# Patient Record
(demographics unavailable — no encounter records)

---

## 2025-04-28 NOTE — REVIEW OF SYSTEMS
[Negative] : Heme/Lymph [FreeTextEntry5] : Hypertension [FreeTextEntry6] : Sleep apnea [FreeTextEntry7] : Loaded pain [de-identified] : Arthritis [de-identified] : Melanoma [de-identified] : Sciatica [de-identified] : Endometriosis

## 2025-04-28 NOTE — ASSESSMENT
[FreeTextEntry1] : 68-year-old lady.  Annual melanoma follow-up.  Clinically doing well.  5/11/2024: Chest x-ray at [facility] was normal. Prescription entered and copy provided to have the study repeated presently.  May 2024 bone density at [facility] was normal.  04/11/25: Bilateral mammogram and breast ultrasounds at [facility]: BI-RADS 2. Prescription provided for April 2026.   If asymptomatic, with normal imaging, we should see her in another year, sooner if needed.  In detail, all questions answered.

## 2025-04-28 NOTE — REVIEW OF SYSTEMS
[Negative] : Heme/Lymph [FreeTextEntry5] : Hypertension [FreeTextEntry6] : Sleep apnea [FreeTextEntry7] : Loaded pain [de-identified] : Arthritis [de-identified] : Melanoma [de-identified] : Sciatica [de-identified] : Endometriosis

## 2025-04-28 NOTE — HISTORY OF PRESENT ILLNESS
[de-identified] : 87-year-old lady.  Annual melanoma follow-up.   CC: Today she is asymptomatic.   2019:  Wide excision of an atypical dysplastic nevus on the posterior RIGHT THIGH, with negative margins, reconstruction by Dr. RAIZA VILLAFUERTE.  This was an asymptomatic pigmented lesion discovered during routine dermatologic surveillance (below).   2009: wide excision of a 1.4 mm melanoma of the RIGHT POPLITEAL fossa, with negative margins and benign right inguinal sentinel lymph node biopsy. Reconstruction was by Dr. Solo BAIRD.   No other personal history of malignancy.    No relatives with melanoma.  Family history of malignancy: A sister had stomach cancer.   Her dermatologist is Dr. Benigno TATUM. Annual visit for 2025 is already scheduled.   PCP: Dr. Robin SOTELO. She used to see Dr. Santosh Borrego, then Dr. John Reyes.  + Allergic: Amlodipine.  She does not have a pacemaker or defibrillator. No anticoagulants.  Spring 2024 she was diagnosed with sleep apnea. Pulmonary: Dr. Blake LEIWS. Due to severe claustrophobia, she is unable to tolerate any of the devices. Her sleep apnea is considered too severe for an oral appliance.  Her internist (above) has started WEGOVY in 2025 in hopes that weight loss will help with the DULCE MARIA.  Blood pressure controlled with Bystolic.  Hyperlipidemia is treated with Repatha.  Cardiology: Dr. Baudilio DESOUZA. She used to see Dr. Neri Sanchez.   2025: Her dentist noticed a lesion on her tongue. Oral surgery consultation recommended. She was only able to secure an appointment in 2025. She asked for suggestions, I offered the name of Dr. Jaime Guillen, and provided her with his contact information.  In the beginning of , she developed bilateral numbness and tingling in the feet, sometimes making it difficult for her to walk. Testing revealed + LATRICE. She has osteoarthritis. Family history: Mother: Lupus and ITP.  Rheumatology evaluation: Dr. Ira CRUZ. Orthopedics: Dr. Alban AUGUSTE. Neurology: Dr. Modesta RAO. No one was able to provide diagnosis, or symptom relief.  She has had epidural injections by pain management (Dr. Jaime STEWART) with no relief. She has bilateral lower extremity sciatica. She would likely follow-up with her neurosurgeon, Dr. Will.  2023: Lumbar microdiscectomy.  2022. Anterior cervical fusion. 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   EYES: She has regular examinations with Dr. Paolo JIN in [place]. She is scheduled for OS cataract surgery in 2025 with Dr. Benitez, from [facility].   GYN:  2013 s/p hysterectomy LSO for endometriosis in  (Dr. Ross MORENO) [place].   She last saw him to . DECLINES follow-up.  Menarche:  12 Menopause:   Age at first pregnancy:  26 Breast fed:  both children 4 - 6 months each No HRT  BRCA: Never been tested.  2024: Bilateral mammogram and sonogram at [facility]: BI-RADS 2. Prescription provided for 2025.  2008: Excisional biopsy of the right breast was benign   COLONOSCOPY:  Follow-up was 2024 w/ Dr Agustin Simmons. - ok X 5 yr.

## 2025-04-28 NOTE — REASON FOR VISIT
[Follow-Up Visit] : a follow-up visit for [Other: _____] : [unfilled] [FreeTextEntry2] : History of melanoma x 2.

## 2025-04-28 NOTE — ASSESSMENT
[FreeTextEntry1] : 68-year-old lady.  Annual melanoma follow-up.  Clinically doing well.  5/11/2024: Chest x-ray at [facility] was normal. Prescription entered and copy provided to have the study repeated presently.  May 2024 bone density at [facility] was normal.  04/11/25: Bilateral mammogram and breast ultrasounds at [facility]: BI-RADS 2. Prescription provided for April 2026.   If asymptomatic, with normal imaging, we should see her in another year, sooner if needed.  In detail, all questions answered.

## 2025-04-28 NOTE — HISTORY OF PRESENT ILLNESS
[de-identified] : 87-year-old lady.  Annual melanoma follow-up.   CC: Today she is asymptomatic.   2019:  Wide excision of an atypical dysplastic nevus on the posterior RIGHT THIGH, with negative margins, reconstruction by Dr. RAIZA VILLAFUERTE.  This was an asymptomatic pigmented lesion discovered during routine dermatologic surveillance (below).   2009: wide excision of a 1.4 mm melanoma of the RIGHT POPLITEAL fossa, with negative margins and benign right inguinal sentinel lymph node biopsy. Reconstruction was by Dr. Solo BAIRD.   No other personal history of malignancy.    No relatives with melanoma.  Family history of malignancy: A sister had stomach cancer.   Her dermatologist is Dr. Benigno TATUM. Annual visit for 2025 is already scheduled.   PCP: Dr. Robin SOTELO. She used to see Dr. Santosh Borrego, then Dr. John Reyes.  + Allergic: Amlodipine.  She does not have a pacemaker or defibrillator. No anticoagulants.  Spring 2024 she was diagnosed with sleep apnea. Pulmonary: Dr. Blake LEWIS. Due to severe claustrophobia, she is unable to tolerate any of the devices. Her sleep apnea is considered too severe for an oral appliance.  Her internist (above) has started WEGOVY in 2025 in hopes that weight loss will help with the DULCE MARIA.  Blood pressure controlled with Bystolic.  Hyperlipidemia is treated with Repatha.  Cardiology: Dr. Baudilio DESOUZA. She used to see Dr. Neri Sanchez.   2025: Her dentist noticed a lesion on her tongue. Oral surgery consultation recommended. She was only able to secure an appointment in 2025. She asked for suggestions, I offered the name of Dr. Jaime Guillen, and provided her with his contact information.  In the beginning of , she developed bilateral numbness and tingling in the feet, sometimes making it difficult for her to walk. Testing revealed + LATRICE. She has osteoarthritis. Family history: Mother: Lupus and ITP.  Rheumatology evaluation: Dr. Ira CRUZ. Orthopedics: Dr. Alban AUGUSTE. Neurology: Dr. Modesta RAO. No one was able to provide diagnosis, or symptom relief.  She has had epidural injections by pain management (Dr. Jaime STEWART) with no relief. She has bilateral lower extremity sciatica. She would likely follow-up with her neurosurgeon, Dr. Will.  2023: Lumbar microdiscectomy.  2022. Anterior cervical fusion. 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   EYES: She has regular examinations with Dr. Paolo JIN in [place]. She is scheduled for OS cataract surgery in 2025 with Dr. Benitez, from [facility].   GYN:  2013 s/p hysterectomy LSO for endometriosis in  (Dr. Ross MORENO) [place].   She last saw him to . DECLINES follow-up.  Menarche:  12 Menopause:   Age at first pregnancy:  26 Breast fed:  both children 4 - 6 months each No HRT  BRCA: Never been tested.  2024: Bilateral mammogram and sonogram at [facility]: BI-RADS 2. Prescription provided for 2025.  2008: Excisional biopsy of the right breast was benign   COLONOSCOPY:  Follow-up was 2024 w/ Dr Agustin Simmons. - ok X 5 yr.

## 2025-04-28 NOTE — PHYSICAL EXAM
[Normal] : supple, no neck mass and thyroid not enlarged [Normal Neck Lymph Nodes] : normal neck lymph nodes  [Normal Supraclavicular Lymph Nodes] : normal supraclavicular lymph nodes [Normal Groin Lymph Nodes] : normal groin lymph nodes [Normal Axillary Lymph Nodes] : normal axillary lymph nodes [Normal] : full range of motion and no deformities appreciated [de-identified] : below

## 2025-04-28 NOTE — PHYSICAL EXAM
[Normal] : supple, no neck mass and thyroid not enlarged [Normal Neck Lymph Nodes] : normal neck lymph nodes  [Normal Supraclavicular Lymph Nodes] : normal supraclavicular lymph nodes [Normal Groin Lymph Nodes] : normal groin lymph nodes [Normal Axillary Lymph Nodes] : normal axillary lymph nodes [Normal] : full range of motion and no deformities appreciated [de-identified] : below

## 2025-06-06 NOTE — HISTORY OF PRESENT ILLNESS
[No Pertinent Cardiac History] : no history of aortic stenosis, atrial fibrillation, coronary artery disease, recent myocardial infarction, or implantable device/pacemaker [No Pertinent Pulmonary History] : no history of asthma, COPD, sleep apnea, or smoking [No Adverse Anesthesia Reaction] : no adverse anesthesia reaction in self or family member [(Patient denies any chest pain, claudication, dyspnea on exertion, orthopnea, palpitations or syncope)] : Patient denies any chest pain, claudication, dyspnea on exertion, orthopnea, palpitations or syncope [Chronic Anticoagulation] : no chronic anticoagulation [Chronic Kidney Disease] : no chronic kidney disease [Diabetes] : no diabetes [FreeTextEntry1] : cataract surgery of left eye  [FreeTextEntry2] : 06/30 [FreeTextEntry3] : Dr. Mayorga  [FreeTextEntry4] : Slight blurriness of left eye over the past year.  Diagnosed with cataracts.  Left worse than right  Has been tolerating Zepbound 1.7 mg for some time now.  Has lost about 15 pounds total.  Reports that she continues to feel better, and symptoms of sleep apnea have improved.  He is receptive to going up to 2.4.  Will start after cataract surgery

## 2025-06-06 NOTE — PLAN
[FreeTextEntry1] : No acute medical contraindications for surgery, thus it is an acceptable risk for patient to have surgery performed Patient aware preoperative precautions Patient aware of risk versus benefits of procedure      To be cautious we will hold Wegovy in the send today prior to cataract surgery.  Patient has an appointment coming up with the ophthalmologist.  Can further discuss with surgeon.  Overall patient's weight has plateaued on current dosage of Wegovy.  Will go up to 2.4 mg.  Continue with diet and exercise.  Follow-up in 3 months    I spent an estimated total time of 40 minutes on this encounter on the date clinical services were rendered. This includes both face-to-face time and non-face-to-face time spent reviewing the patient's chart, prior lab results, relevant imaging/diagnostic studies, and documentation in the EHR. An estimated total of 5 minutes was spent answering the patient's and/or any family member's questions.

## 2025-06-06 NOTE — ASSESSMENT
[High Risk Surgery - Intraperitoneal, Intrathoracic or Supringuinal Vascular Procedures] : High Risk Surgery - Intraperitoneal, Intrathoracic or Supringuinal Vascular Procedures - No (0) [Ischemic Heart Disease] : Ischemic Heart Disease - No (0) [Congestive Heart Failure] : Congestive Heart Failure - No (0) [Prior Cerebrovascular Accident or TIA] : Prior Cerebrovascular Accident or TIA - No (0) [Creatinine >= 2mg/dL (1 Point)] : Creatinine >= 2mg/dL - No (0) [Insulin-dependent Diabetic (1 Point)] : Insulin-dependent Diabetic - No (0) [0] : 0 , RCRI Class: I, Risk of Post-Op Cardiac Complications: 3.9%, 95% CI for Risk Estimate: 2.8% - 5.4% [Patient Optimized for Surgery] : Patient optimized for surgery [No Further Testing Recommended] : no further testing recommended [Modify medications prior to procedure] : Modify medications prior to procedure [FreeTextEntry7] : Will hold Wegovy this Saturday prior to surgery

## 2025-07-17 NOTE — PLAN
[FreeTextEntry1] : No acute medical contraindications for surgery, thus it is an acceptable risk for patient to have surgery performed Patient aware preoperative precautions Patient aware of risk versus benefits of procedure

## 2025-07-17 NOTE — HISTORY OF PRESENT ILLNESS
[No Pertinent Cardiac History] : no history of aortic stenosis, atrial fibrillation, coronary artery disease, recent myocardial infarction, or implantable device/pacemaker [No Pertinent Pulmonary History] : no history of asthma, COPD, sleep apnea, or smoking [No Adverse Anesthesia Reaction] : no adverse anesthesia reaction in self or family member [Chronic Anticoagulation] : no chronic anticoagulation [Chronic Kidney Disease] : no chronic kidney disease [Diabetes] : no diabetes [(Patient denies any chest pain, claudication, dyspnea on exertion, orthopnea, palpitations or syncope)] : Patient denies any chest pain, claudication, dyspnea on exertion, orthopnea, palpitations or syncope [FreeTextEntry1] : cataract surgery of right eye  [FreeTextEntry2] : 07/29 [FreeTextEntry3] : Dr. Mayorga  [FreeTextEntry4] : S/p cataract surgery of left eye now pending right.  Overall feeling well with no acute symptoms